# Patient Record
Sex: FEMALE | Race: WHITE | NOT HISPANIC OR LATINO | Employment: OTHER | URBAN - METROPOLITAN AREA
[De-identification: names, ages, dates, MRNs, and addresses within clinical notes are randomized per-mention and may not be internally consistent; named-entity substitution may affect disease eponyms.]

---

## 2024-06-05 ENCOUNTER — OFFICE VISIT (OUTPATIENT)
Dept: PODIATRY | Facility: OTHER | Age: 89
End: 2024-06-05
Attending: PODIATRIST
Payer: COMMERCIAL

## 2024-06-05 VITALS
SYSTOLIC BLOOD PRESSURE: 130 MMHG | HEART RATE: 70 BPM | TEMPERATURE: 97.6 F | OXYGEN SATURATION: 96 % | DIASTOLIC BLOOD PRESSURE: 75 MMHG

## 2024-06-05 DIAGNOSIS — M79.671 RIGHT FOOT PAIN: ICD-10-CM

## 2024-06-05 DIAGNOSIS — L84 CALLUS OF FOOT: Primary | ICD-10-CM

## 2024-06-05 PROCEDURE — G0463 HOSPITAL OUTPT CLINIC VISIT: HCPCS | Performed by: PODIATRIST

## 2024-06-05 PROCEDURE — 99203 OFFICE O/P NEW LOW 30 MIN: CPT | Performed by: PODIATRIST

## 2024-06-05 RX ORDER — METOPROLOL SUCCINATE 50 MG/1
50 TABLET, EXTENDED RELEASE ORAL DAILY
COMMUNITY

## 2024-06-05 RX ORDER — WARFARIN SODIUM 5 MG/1
5 TABLET ORAL DAILY
COMMUNITY

## 2024-06-05 RX ORDER — FUROSEMIDE 20 MG
20 TABLET ORAL DAILY
COMMUNITY

## 2024-06-05 NOTE — PATIENT INSTRUCTIONS
---A size small toe sleeve was placed on your RIGHT foot third toe. You may find these at [facility] or [facility] or [facility] or [facility]. Do not wear the toe sleeve(s) at night, but only wear the sleeve in shoes and/or socks during the day. The sleeves are re-usable and hand washable until they wear out. Do not wear this on the toe if you have an open blister. You may secure the toe sleeve with a band aid if it is not staying on the toe. Call podiatry if you cannot find another toe sleeve and you need a new one.    -You may also gently file the callus on the tip of the toe when it comes back. You may use a nail file for this and then apply lotion to the tip of the toe.    -If you notice a blood blister on the end of the toe again (a balack spot), if you have drainage or bleeding or an open wound on the tip of the toe, if the toe becomes uncomfortable again or if you notice any signs of infection (redness, swelling, pain, purulence, fever, chills, nausea, vomiting) then call podiatry ([phone]) or go to the Emergency Department. This may be an infection that needs to be treated and can be serious.

## 2024-06-05 NOTE — PROGRESS NOTES
Chief complaint: Patient presents with:  Musculoskeletal Problem: Callus      History of Present Illness: This 89 year old female is seen at the request of No ref. provider found for evaluation and suggestions of management of tenderness on the RIGHT distal third toe. The patient has orthopedic shoes and accommodative orthotics from the orthotist, Kalpana Gibson. She saw Kalpana today because she was having discomfort at the tip of the RIGHT third toe. The patient wanted to know what could be done for the discomfort in the toe.    No further pedal complaints today.       /75 (BP Location: Right arm, Patient Position: Sitting, Cuff Size: Adult Regular)   Pulse 70   Temp 97.6  F (36.4  C) (Tympanic)   SpO2 96%     There is no problem list on file for this patient.      Past Surgical History:   Procedure Laterality Date    PHACOEMULSIFICATION WITH STANDARD INTRAOCULAR LENS IMPLANT  8/12/2013    Procedure: PHACOEMULSIFICATION WITH STANDARD INTRAOCULAR LENS IMPLANT;  CATARACT EXTRACTION WITH INTRA OCULAR LENS RIGHT;  Surgeon: Munir Perry MD;  Location: [facility]       Current Outpatient Medications   Medication Sig Dispense Refill    aspirin 81 MG tablet Take by mouth daily      furosemide (LASIX) 20 MG tablet Take 20 mg by mouth daily      losartan (COZAAR) 2.5 mg/mL SUSP       metoprolol succinate ER (TOPROL XL) 50 MG 24 hr tablet Take 50 mg by mouth daily      pravastatin (PRAVACHOL) 20 MG tablet Take 20 mg by mouth every evening      warfarin ANTICOAGULANT (COUMADIN) 5 MG tablet Take 5 mg by mouth daily      Cranberry 250 MG TABS Take by mouth daily (Patient not taking: Reported on 6/5/2024)      hydrALAZINE-HCTZ 25-25 MG CAPS Take 1 tablet by mouth every evening  (Patient not taking: Reported on 6/5/2024)      lisinopril (PRINIVIL,ZESTRIL) 10 MG tablet Take 10 mg by mouth daily (Patient not taking: Reported on 6/5/2024)      multivitamin, therapeutic with minerals (MULTI-VITAMIN) TABS Take 1 tablet by mouth  daily (Patient not taking: Reported on 6/5/2024)      NIFEDIPINE PO Take 90 mg by mouth daily (Patient not taking: Reported on 6/5/2024)      Omega-3 Fatty Acids (OMEGA-3 FISH OIL PO) Take 1,200 mg by mouth daily (Patient not taking: Reported on 6/5/2024)      VITAMIN E 400 Units daily       No current facility-administered medications for this visit.          Allergies   Allergen Reactions    Amoxicillin Swelling    Noroxin [Norfloxacin] Unknown    Bactrim [Sulfamethoxazole W-Trimethoprim] Rash    Sulfa Antibiotics Rash    Tavist Rash       History reviewed. No pertinent family history.    Social History     Socioeconomic History    Marital status:      Spouse name: None    Number of children: None    Years of education: None    Highest education level: None   Tobacco Use    Smoking status: Never    Smokeless tobacco: Never     Social Determinants of Health     Financial Resource Strain: Low Risk  (12/16/2021)    [facility]    Overall Financial Resource Strain (CARDIA)     Difficulty of Paying Living Expenses: Not hard at all   Food Insecurity: No Food Insecurity (5/9/2024)    [facility]    Hunger Vital Sign     Worried About Running Out of Food in the Last Year: Never true     Ran Out of Food in the Last Year: Never true   Transportation Needs: No Transportation Needs (5/9/2024)    [facility]    PRAPARE - Transportation     Lack of Transportation (Medical): No     Lack of Transportation (Non-Medical): No   Physical Activity: Inactive (12/16/2021)    [facility]    Exercise Vital Sign     Days of Exercise per Week: 0 days     Minutes of Exercise per Session: 0 min   Stress: No Stress Concern Present (12/16/2021)    Received from [facility]    [place] [facility] [employer] - Occupational Stress Questionnaire     Feeling of Stress : Not at all   Social Connections: Moderately Isolated (12/16/2021)    [facility] [employer] [facility]    Social Connection and Isolation Panel [NHANES]     Frequency of Communication with Friends and Family: More than three times a week     Frequency of Social Gatherings with Friends and Family: Once a week     Attends Catholic Services: Never     Active Member of Clubs or Organizations: No     Attends Club or Organization Meetings: Never     Marital Status:    Interpersonal Safety: Not At Risk (5/9/2024)    [facility] [employer] [facility] [employer]     Do you feel UNSAFE in any of your personal relationships with your family members or any other acquaintances?: No   Housing Stability: Unknown (5/9/2024)    Received from [employer] [facility]    Housing Stability Vital Sign     Unable to Pay for Housing in the Last Year: No     Homeless in the Last Year: No       ROS: 10 point ROS neg other than the symptoms noted above in the HPI.  EXAM  Constitutional: healthy, alert, and no distress    Psychiatric: mentation appears normal and affect normal/bright    VASCULAR:  -Dorsalis pedis pulse +1/4 b/l  -Posterior tibial pulse +1/4 b/l  -Capillary refill time < 3 seconds to b/l hallux  -Varicosities and telangiectasias to foot, bilaterally   -Mild-to-moderate 1+ to 2+ pitting edema on LEFT and 2+ to 3+ on RIGHT foot and ankle, bilaterally   NEURO:  -Light touch sensation intact to b/l plantar forefoot  DERM:  -Skin temperature, texture and turgor WNL b/l  -Hyperkeratotic lesion on the distal plantar tip of the RIGHT third toe  ---Blood blister beneath the toe -- no open wounds post removal of dead skin  MSK:  -Tenderness on palpation to the RIGHT distal third toe    -DORSIFLEXION contracture to MTPJ 2-5 b/l with flexion contracture to PIPJ of digits 2-5  b/l    -Muscle strength of ankles +5/5 for dorsiflexion, plantarflexion, ABDUction and ADDuction b/l    ============================================================    ASSESSMENT:  (L84) Callus of foot  (primary encounter diagnosis)    (M21.077) Right foot pain      PLAN:  -Patient evaluated and examined. Treatment options discussed with no educational barriers noted.    Calluses on the bottom surface of the foot will continue to come back due to the pressure from the bone on the bottom of your foot. Patient has a mild hammertoe which increases pressure on the tip of the toe. Minimally removed dried skin from the tip of the toe with a nipper. There was dried blood but no open wounds on the end of the toe.  ---Dispensed size small toe sleeve. Patient may find these at [employer] or [employer]. Patient should not wear the toe sleeve(s) at night, but only wear the sleeve in shoes and/or socks during the day. The sleeves are re-usable and hand washable until they wear out. Patient had pain relief upon standing.    -The orthotist, Kalpana Gibson said the patient was wearing her shoes too loosely and her foot was sliding in her shoe. She was advised to make her shoes tighter when she is walking.    Additional instructions provided for patient:    ---A size small toe sleeve was placed on your RIGHT foot third toe. You may find these at [employer] or [employer] or [facility] or [facility]. Do not wear the toe sleeve(s) at night, but only wear the sleeve in shoes and/or socks during the day. The sleeves are re-usable and hand washable until they wear out. Do not wear this on the toe if you have an open blister. You may secure the toe sleeve with a band aid if it is not staying on the toe. Call podiatry if you cannot find another toe sleeve and you need a new one.    -You may also gently file the callus on the tip of the toe when it comes back. You may use a nail file for this and then apply lotion to the tip of the toe.    -If you notice a blood  blister on the end of the toe again (a balack spot), if you have drainage or bleeding or an open wound on the tip of the toe, if the toe becomes uncomfortable again or if you notice any signs of infection (redness, swelling, pain, purulence, fever, chills, nausea, vomiting) then call podiatry ([phone]) or go to the Emergency Department. This may be an infection that needs to be treated and can be serious.    -This is an acute, uncomplicated illness/injury with OTC treatment options reviewed.    -Patient in agreement with the above treatment plan and all of patient's questions were answered.      Return to clinic as needed        Madison Lee DPM